# Patient Record
Sex: FEMALE | Race: WHITE | NOT HISPANIC OR LATINO | Employment: OTHER | URBAN - METROPOLITAN AREA
[De-identification: names, ages, dates, MRNs, and addresses within clinical notes are randomized per-mention and may not be internally consistent; named-entity substitution may affect disease eponyms.]

---

## 2017-04-24 LAB
CALCIUM SERPL-MCNC: 9.3 MG/DL (ref 9–10.9)
POTASSIUM SERPL-SCNC: 5 MMOL/L (ref 3.5–5.1)
SODIUM SERPL-SCNC: 141 MEQ/L (ref 131–145)

## 2017-06-28 LAB — CRC RECOMMENDATION EXT: NORMAL

## 2017-07-20 LAB
BILIRUB SERPL-MCNC: NORMAL MG/DL
BLOOD URINE, POC: NEGATIVE
GLUCOSE UR QL STRIP: NORMAL
KETONES UR QL STRIP: NEGATIVE
LEUKOCYTE EST, POC UA: NEGATIVE
NITRITE, POC UA: NEGATIVE
PH, POC UA: 5
PROTEIN, POC: NEGATIVE
SPECIFIC GRAVITY, POC UA: 1.02
UROBILINOGEN, POC UA: NORMAL

## 2019-06-24 ENCOUNTER — HISTORICAL (OUTPATIENT)
Dept: CARDIOLOGY | Facility: HOSPITAL | Age: 74
End: 2019-06-24

## 2019-11-08 LAB
BUN SERPL-MCNC: 16 MG/DL (ref 7–18)
CHOLEST SERPL-MCNC: 114 MG/DL
CREAT SERPL-MCNC: 0.54 MG/DL (ref 0.6–1.3)
GLUCOSE SERPL-MCNC: 139 MG/DL (ref 74–106)
HDLC SERPL-MCNC: 49 MG/DL (ref 35–60)
LDLC SERPL CALC-MCNC: 49 MG/DL
TRIGL SERPL-MCNC: 80 MG/DL (ref 30–150)

## 2020-09-08 ENCOUNTER — HISTORICAL (OUTPATIENT)
Dept: ADMINISTRATIVE | Facility: HOSPITAL | Age: 75
End: 2020-09-08

## 2020-09-29 ENCOUNTER — HISTORICAL (OUTPATIENT)
Dept: ADMINISTRATIVE | Facility: HOSPITAL | Age: 75
End: 2020-09-29

## 2020-12-31 LAB — BCS RECOMMENDATION EXT: NORMAL

## 2022-02-28 ENCOUNTER — PATIENT OUTREACH (OUTPATIENT)
Dept: ADMINISTRATIVE | Facility: CLINIC | Age: 77
End: 2022-02-28

## 2022-02-28 ENCOUNTER — EXTERNAL HOSPITAL ADMISSION (OUTPATIENT)
Dept: ADMINISTRATIVE | Facility: CLINIC | Age: 77
End: 2022-02-28

## 2022-02-28 RX ORDER — METOPROLOL SUCCINATE 25 MG/1
25 TABLET, EXTENDED RELEASE ORAL DAILY
COMMUNITY
End: 2023-06-15 | Stop reason: SDUPTHER

## 2022-02-28 NOTE — PROGRESS NOTES
C3 nurse attempted to contact Ade Mendez for a TCC post hospital discharge follow up call. No answer. The patient does not have a scheduled HOSFU appointment, and the pt does not have an [facility] PCP.    C3 nurse spoke with Ade Mendez for a TCC post hospital discharge follow up call. The patient reports does not have a scheduled HOSFU appointment. C3 nurse was unable to schedule HOSFU appointment for Non-[facility] PCP. Patient advised to contact their PCP to schedule a HOSPFU within 7 days. Outside NP service.

## 2022-04-10 ENCOUNTER — HISTORICAL (OUTPATIENT)
Dept: ADMINISTRATIVE | Facility: HOSPITAL | Age: 77
End: 2022-04-10

## 2022-04-26 VITALS
BODY MASS INDEX: 31.57 KG/M2 | DIASTOLIC BLOOD PRESSURE: 76 MMHG | HEIGHT: 62 IN | WEIGHT: 171.56 LBS | SYSTOLIC BLOOD PRESSURE: 138 MMHG | OXYGEN SATURATION: 98 %

## 2022-04-30 NOTE — OP NOTE
Patient:   Ade Mendez             MRN: [id number]            FIN: [id number]               Age:   74 years     Sex:  Female     :  1945   Associated Diagnoses:   None   Author:   Hector Lane MD      Preoperative Diagnosis:  Cataract Right eye    Postoperative Diagnosis:  Cataract Right eye    Procedure:  Phacoemulsification with intraocular lens implantation Right eye    Surgeon:  Hector Lane MD    Assistant:  Briana Skelton [facility]    Anestheisa:  MAC    Complications:  None    The patient was brought to the [facility].  A time out was performed.  The capsulotomy, lens fragmentation and limbal relaxing incisions were completed.  Then the patient was brought into the operating suite, where the patient was correctly identified as was the operative eye via timeout.  The patient was prepped and draped in a sterile ophthalmic fashion.  A lid speculum was placed in the operative eye and the microscope was brought into place.  A 1.0 mm paracentesis was then made at (12) o'clock.  The anterior chamber was filled with Endocoat.  A (temporal) clear corneal incision was made with a 2.4 mm keratome blade.  A 6 mm corneal marking ring was used to fadia the cornea centered over the visual axis.  A 5.00 mm continuous curvilinear capsulorhexis was fashioned using a cystotome and microcapsular forceps.  Hydrodissection and hydrodelineation was performed with upreserved 1% Xylocaine.  The nucleus was then phacoemulsified with the Abbott phacoemulsification hand-piece with a total of (25) EFX.  The cortex was then removed with the I/A hand-piece.  The lens model (ZCB00) with a power of (22.5) was placed in the capsular bag.  The Helon was then removed from the eye with the I/A hand piece.  The anterior chamber was inflated and the wounds were hydrated with BSS.  The wounds were checked with Weck-Bessie sponges and found to be watertight.  The lid speculum was removed and topical antibiotics were placed on the  operative eye.  The patient was brought to PACU in good condition.

## 2022-04-30 NOTE — OP NOTE
Patient:   Ade Mendez             MRN: [id number]            FIN: [id number]               Age:   74 years     Sex:  Female     :  1945   Associated Diagnoses:   None   Author:   Hector Lane MD      Preoperative Diagnosis:  Cataract Left eye    Postoperative Diagnosis:  Cataract Left eye    Procedure:  Phacoemulsification with intraocular lens implantation Left eye    Surgeon:  Hector Lane MD    Assistant:   Briana Skelton [facility]    Anestheisa:  MAC    Complications:  None    The patient was brought to the [facility] laser.  A time out was performed.  The capsulotomy, lens fragmentation and limbal relaxing incisions were completed.  Then the patient was brought into the operating suite, where the patient was correctly identified as was the operative eye via timeout.  The patient was prepped and draped in a sterile ophthalmic fashion.  A lid speculum was placed in the operative eye and the microscope was brought into place.  A 1.0 mm paracentesis was then made at (6) o'clock.  The anterior chamber was filled with Endocoat.  A (temporal) clear corneal incision was made with a 2.4 mm keratome blade.  A 6 mm corneal marking ring was used to fadia the cornea centered over the visual axis.  A 5.00 mm continuous curvilinear capsulorhexis was fashioned using a cystotome and microcapsular forceps.  Hydrodissection and hydrodelineation was performed with upreserved 1% Xylocaine.  The nucleus was then phacoemulsified with the Abbott phacoemulsification hand-piece with a total of (5) EFX.  The cortex was then removed with the I/A hand-piece.  The lens model (ZCB00) with a power of (23.0) was placed in the capsular bag.  The Helon was then removed from the eye with the I/A hand piece.  The anterior chamber was inflated and the wounds were hydrated with BSS.  The wounds were checked with Weck-Bessie sponges and found to be watertight.  The lid speculum was removed and topical antibiotics were placed on the operative  eye.  The patient was brought to PACU in good condition.

## 2022-06-12 PROBLEM — I65.29 STENOSIS OF CAROTID ARTERY: Status: ACTIVE | Noted: 2022-06-12

## 2022-06-12 PROBLEM — I25.10 ATHEROSCLEROSIS OF CORONARY ARTERY: Status: ACTIVE | Noted: 2022-06-12

## 2022-06-12 PROBLEM — R74.8 HIGH ALKALINE PHOSPHATASE: Status: ACTIVE | Noted: 2022-06-12

## 2022-06-12 PROBLEM — E78.5 HYPERLIPIDEMIA: Status: ACTIVE | Noted: 2022-06-12

## 2022-06-12 PROBLEM — E11.21 DIABETIC NEPHROPATHY ASSOCIATED WITH TYPE 2 DIABETES MELLITUS: Status: ACTIVE | Noted: 2022-06-12

## 2022-06-12 PROBLEM — E11.9 TYPE 2 DIABETES MELLITUS: Status: ACTIVE | Noted: 2022-06-12

## 2022-06-12 PROBLEM — F41.9 ANXIETY: Status: ACTIVE | Noted: 2022-06-12

## 2022-06-12 PROBLEM — M81.0 OSTEOPOROSIS: Status: ACTIVE | Noted: 2022-06-12

## 2022-06-13 NOTE — PROGRESS NOTES
Venessa Jaime MD   [address] [facility] [place] [address]     PATIENT NAME: Ade Mendez  : 1945  DATE: 22  MRN: [id number]      Billing Provider: Venessa Jaime MD  Level of Service: [place] WELCOME MEDICARE ANNUAL WELLNESS SUBSEQUENT VISIT  Patient PCP Information     Provider PCP Type    Venessa Jaime MD General          Reason for Visit / Chief Complaint: Medicare AWV       Update PCP  Update Chief Complaint         History of Present Illness / Problem Focused Workflow     Ade Mendez presents to the clinic with Medicare AWV     76 year old CF seen for annual exam. She was hospitalized at the [facility] in Feb after an abnormal nuclear stress with Dr Ortega and ended up with a quadruple bypass with Dr Narayanan. She did well and spent 2 weeks with her daughter then back home. She has a plate instead of wires. She notes some swelling in the left leg where they took her vein. She is due to follow up with Dr Valdez and Valentino She will do lab with them and send it to me. She also went to [facility] for sinus/allergy issues. She is still seeing Dr Denton for her annual eye check. She does her mammogram with Dr Doll.     Fall assessment negative  Cognitive assessment normal.   TUG test negative  Depression Screen negative  Whisper test normal  Home safety assesed  Advanced directive has one discussion 15 minutes.       Review of Systems     Review of Systems   Constitutional:        Not as much energy as she once had but still gets out and walks   HENT:        Had sinus congestion it is better now.    Eyes: Negative.    Respiratory: Negative.    Cardiovascular:        Negative excepts as per HPI   Gastrointestinal: Negative.    Endocrine:        Sugars have been a bit high since surgery, she had spoken with Tee about it. She's not sure why   Genitourinary: Negative.    Musculoskeletal:        No arthritis, walked fine the day of surgery   Skin:        Still sees dermatologist, Dr Bhandari did  Moh's on her scalp   Allergic/Immunologic: Positive for environmental allergies.   Neurological: Negative.    Hematological: Negative.    Psychiatric/Behavioral: Negative.        Medical / Social / Family History     Past Medical History:   Diagnosis Date    Alkaline phosphatase elevation     Anxiety     CAD (coronary artery disease)     Cataracts, bilateral     Diabetes mellitus, type 2     Diverticulosis     Hepatitis B     Osteoporosis     Rectal polyp 07/05/2017    Renal disorder        Past Surgical History:   Procedure Laterality Date    APPENDECTOMY      CATARACT EXTRACTION, BILATERAL      CORONARY ARTERY BYPASS GRAFT (CABG)  02/21/2022    quadruple    CORONARY STENT PLACEMENT      KNEE SURGERY      REPAIR OF HOLE IN MACULA      SKIN CANCER EXCISION  06/07/2021    head lesion    TOTAL ABDOMINAL HYSTERECTOMY      WRIST SURGERY  10/10/2019       Social History  Ms. Mendez      reports that she has never smoked. She has never used smokeless tobacco.    Family History  Ms.'s Mendez   family history includes Diabetes in her mother; Liver cancer in her brother; Lung cancer in her brother; Schizophrenia in her brother.    Medications and Allergies     Medications  Outpatient Medications Marked as Taking for the 6/14/22 encounter (Office Visit) with Venessa Jaime MD   Medication Sig Dispense Refill    aspirin (ECOTRIN) 81 MG EC tablet Take 81 mg by mouth once daily at 6am.      empagliflozin (JARDIANCE) 10 mg tablet Take 1 tablet by mouth once daily.      glimepiride (AMARYL) 4 MG tablet Take 4 mg by mouth 2 (two) times a day.      metFORMIN (GLUCOPHAGE) 1000 MG tablet Take 1,000 mg by mouth 2 (two) times a day.      metoprolol succinate (TOPROL-XL) 25 MG 24 hr tablet Take 25 mg by mouth once daily.      metoprolol tartrate (LOPRESSOR) 25 MG tablet Take 1 tablet by mouth 2 (two) times daily.      montelukast (SINGULAIR) 10 mg tablet Take 10 mg by mouth once daily at 6am.       risedronate (ACTONEL) 150 MG Tab Take 150 mg by mouth every 30 days.      rosuvastatin (CRESTOR) 10 MG tablet Take 10 mg by mouth once daily at 6am.         Allergies  Review of patient's allergies indicates:   Allergen Reactions    Oxycontin [oxycodone]        Physical Examination     Vitals:    06/14/22 0859   BP: 123/76   Pulse: 65   Resp: 16   Temp: 97.9 °F (36.6 °C)     Physical Exam  Vitals reviewed.   Constitutional:       Appearance: Normal appearance.   HENT:      Head: Normocephalic.      Comments: Some wax on right canal     Right Ear: Tympanic membrane and external ear normal.      Left Ear: Tympanic membrane, ear canal and external ear normal.      Mouth/Throat:      Mouth: Mucous membranes are moist.      Pharynx: No oropharyngeal exudate.   Eyes:      Extraocular Movements: Extraocular movements intact.      Pupils: Pupils are equal, round, and reactive to light.   Cardiovascular:      Rate and Rhythm: Normal rate and regular rhythm.      Pulses:           Dorsalis pedis pulses are 3+ on the right side and 3+ on the left side.   Pulmonary:      Effort: Pulmonary effort is normal.      Breath sounds: Normal breath sounds. No rhonchi.   Abdominal:      General: Bowel sounds are normal.      Palpations: Abdomen is soft.      Tenderness: There is no abdominal tenderness. There is no guarding.      Hernia: No hernia is present.   Musculoskeletal:         General: Normal range of motion.      Cervical back: Normal range of motion and neck supple. No tenderness.      Right lower leg: Edema present.      Left lower leg: Edema present.   Feet:      Right foot:      Protective Sensation: 10 sites tested. 10 sites sensed.      Skin integrity: Skin integrity normal.      Left foot:      Protective Sensation: 10 sites tested. 10 sites sensed.      Skin integrity: Skin integrity normal.   Skin:     General: Skin is warm and dry.      Comments: Incisions have healed great   Neurological:      General: No focal  deficit present.      Mental Status: She is alert and oriented to person, place, and time.      Coordination: Coordination normal.      Gait: Gait normal.   Psychiatric:         Mood and Affect: Mood normal.         Behavior: Behavior normal.         Thought Content: Thought content normal.         Judgment: Judgment normal.           Assessment and Plan (including Health Maintenance)      Problem List  Smart [employer]  Document Outside HM   :    Plan:   Annual exam she's had major surgery since last here but is back at baseline for activity, will get lab with her specialist and have it sent to us from [employer]. She will also remind her eye doctor to send us a copy Health Maintenance is unreliable due to the merge of data from [email or web address].   Reviewed records in [employer] from outside for surgery and preop. Limited lab I could find.      Health Maintenance Due   Topic Date Due    Hepatitis C Screening  Never done    TETANUS VACCINE  Never done    DEXA Scan  06/28/2022       Problem List Items Addressed This Visit    None         Health Maintenance Topics with due status: Not Due       Topic Last Completion Date    Lipid Panel 01/17/2022       Future Appointments   Date Time Provider Department Center   9/28/2022  8:30 AM Robert Doll MD [facility]   6/15/2023  8:20 AM Venessa Jaime MD [facility]            Signature:  Venessa Jaime MD  [facility]  [address] [place] [address]    Date of encounter: 6/14/22

## 2022-06-14 ENCOUNTER — OFFICE VISIT (OUTPATIENT)
Dept: PRIMARY CARE CLINIC | Facility: CLINIC | Age: 77
End: 2022-06-14
Payer: MEDICARE

## 2022-06-14 VITALS
RESPIRATION RATE: 16 BRPM | HEART RATE: 65 BPM | TEMPERATURE: 98 F | WEIGHT: 162.63 LBS | BODY MASS INDEX: 31.93 KG/M2 | OXYGEN SATURATION: 97 % | DIASTOLIC BLOOD PRESSURE: 76 MMHG | HEIGHT: 60 IN | SYSTOLIC BLOOD PRESSURE: 123 MMHG

## 2022-06-14 DIAGNOSIS — Z95.1 HX OF CABG: ICD-10-CM

## 2022-06-14 DIAGNOSIS — E11.59 TYPE 2 DIABETES MELLITUS WITH OTHER CIRCULATORY COMPLICATION, WITHOUT LONG-TERM CURRENT USE OF INSULIN: ICD-10-CM

## 2022-06-14 DIAGNOSIS — Z71.89 ADVANCE CARE PLANNING: ICD-10-CM

## 2022-06-14 DIAGNOSIS — Z00.00 MEDICARE ANNUAL WELLNESS VISIT, SUBSEQUENT: Primary | ICD-10-CM

## 2022-06-14 DIAGNOSIS — Z85.828 HISTORY OF SKIN CANCER: ICD-10-CM

## 2022-06-14 PROCEDURE — G0439 PR MEDICARE ANNUAL WELLNESS SUBSEQUENT VISIT: ICD-10-PCS | Mod: ,,, | Performed by: INTERNAL MEDICINE

## 2022-06-14 PROCEDURE — G0439 PPPS, SUBSEQ VISIT: HCPCS | Mod: ,,, | Performed by: INTERNAL MEDICINE

## 2022-06-14 PROCEDURE — 99497 ADVNCD CARE PLAN 30 MIN: CPT | Mod: 33,,, | Performed by: INTERNAL MEDICINE

## 2022-06-14 PROCEDURE — 99497 PR ADVNCD CARE PLAN 30 MIN: ICD-10-PCS | Mod: 33,,, | Performed by: INTERNAL MEDICINE

## 2022-06-14 RX ORDER — ASPIRIN 81 MG/1
81 TABLET ORAL DAILY
COMMUNITY

## 2022-06-14 RX ORDER — EMPAGLIFLOZIN 10 MG/1
1 TABLET, FILM COATED ORAL DAILY
COMMUNITY

## 2022-06-14 RX ORDER — RISEDRONATE SODIUM 150 MG/1
150 TABLET, FILM COATED ORAL
COMMUNITY

## 2022-06-14 RX ORDER — METFORMIN HYDROCHLORIDE 1000 MG/1
1000 TABLET ORAL 2 TIMES DAILY
COMMUNITY

## 2022-06-14 RX ORDER — GLIMEPIRIDE 4 MG/1
4 TABLET ORAL 2 TIMES DAILY
COMMUNITY

## 2022-06-14 RX ORDER — METOPROLOL TARTRATE 25 MG/1
1 TABLET, FILM COATED ORAL 2 TIMES DAILY
COMMUNITY
Start: 2022-02-25 | End: 2023-06-15

## 2022-06-14 RX ORDER — MONTELUKAST SODIUM 10 MG/1
10 TABLET ORAL DAILY
COMMUNITY

## 2022-06-14 RX ORDER — ROSUVASTATIN CALCIUM 10 MG/1
10 TABLET, COATED ORAL DAILY
COMMUNITY

## 2022-06-14 NOTE — PROGRESS NOTES
",TIME UP & GO (TUG)  Test begins with patient sitting back in standard arm chair.   When "Go" is said, the patient stands up and walks 10 feet at a normal pace before turning, walking back and sitting down.    Observe the patients postural stability, gait, stride length, and sway.  Check all that apply:  ? [x] Slow tentative pace  ? [] Loss of balance  ? [] Short strides  ? [] Little or no arm swing  ? [] Steadying self on walls  ? [] Shuffling  ? [] En bloc turning  ? [] Not using assistive device properly    Time in seconds:  3 Seconds  (Older adults who takes = or > 12 seconds to complete TUG is at risk for falling.      WHISPER TEST  Test begins with patient standing arms length away (2 feet), facing away from examiner.  Patient covers the ear that is NOT being tested with one finger over the tragus.  Whisper a number-letter-number combination.  If a patient gets 3 total letters and/or numbers correct after a second attempt, it is considered a pass.    Right Ear: passed    [x] 8-M-3   [] K-5-R   [] 2-K-7   [] S-4-G  Left Ear: passed       [x] 8-M-3   [] K-5-R   [] 2-K-7   [] S-4-G      VISION SCREENING  unable to measure      MINI-COGNITIVE  Three Word Registration   []Version 1 [x]Version 2 []Version 3 []Version 4 []Version 5 []Version 6   [facility] Daughter   [place] [employer] Garden Heaven   Chair Table Baby Finger Picture Moutain     Word Recall 3 points  Clock Drawing 2      HOME SAFETY QUESTIONNAIRE  Are emergency numbers kept by the phone and regularly updated? YesyesAre all household members aware of the dangers of smoking, especially in bed? Yes  Are working smoke alarm(s) and fire extinguisher(s) available for use? yes  Do all household members know how to use them? Yes  Are firearms stored unloaded and securely locked? N/A   Have throw rugs been removed or fastened down? Yes  Are non-slip mats in all bathtubs and showers?  Yes  Do all stairways have a railing or " banister? N/A  Are sidewalks and all outdoor steps clear of tools, toys and other articles?  Yes  Are doorways, halls, and stairs free of clutter?  Yes  Are all electrical cords in working order, easily seen, and not run under rug/carpets or wrapped around nails? N/A

## 2022-06-22 ENCOUNTER — PATIENT OUTREACH (OUTPATIENT)
Dept: ADMINISTRATIVE | Facility: HOSPITAL | Age: 77
End: 2022-06-22
Payer: MEDICARE

## 2022-06-22 NOTE — PROGRESS NOTES
Population Health Outreach. The following record(s)  below were uploaded for Health Maintenance .    12/31/2020 MAMMOGRAM SCREENING              06/03/2021 EYE EXAM    06/28/2017 COLONOSCOPY

## 2022-08-29 ENCOUNTER — TELEPHONE (OUTPATIENT)
Dept: PRIMARY CARE CLINIC | Facility: CLINIC | Age: 77
End: 2022-08-29
Payer: MEDICARE

## 2022-08-29 NOTE — TELEPHONE ENCOUNTER
"----- Message from Rahul Farhat sent at 8/29/2022  8:40 AM CDT -----  .Type:  Needs Medical Advice    Who Called: pt  Would the patient rather a call back or a response via [employer]? Call back  Best Call Back Number: [phone]  Additional Information: call back see demands to speak with "the front office" said she has called multiple times and left multiple messages but no one has called her back       "

## 2022-08-29 NOTE — TELEPHONE ENCOUNTER
----- Message from DeEbonyJoceline Grady sent at 8/29/2022  9:20 AM CDT -----  Regarding: Patient Call  Type:  Patient Returning Call    Who Called: patient  Who Left Message for Patient: nurse  Does the patient know what this is regarding?: yes  Would the patient rather a call back or a response via [employer]?  Call back   Best Call Back Number: [phone]  Additional Information:  patient requesting a call back about billing ; she didn't want to be transferred to the correct department, and is not understanding how the office makes up these prices - she was charged twice $528 for something she didn't get

## 2022-09-18 ENCOUNTER — HISTORICAL (OUTPATIENT)
Dept: ADMINISTRATIVE | Facility: HOSPITAL | Age: 77
End: 2022-09-18
Payer: MEDICARE

## 2022-09-19 ENCOUNTER — HISTORICAL (OUTPATIENT)
Dept: ADMINISTRATIVE | Facility: HOSPITAL | Age: 77
End: 2022-09-19
Payer: MEDICARE

## 2023-04-21 LAB
BCS RECOMMENDATION EXT: NORMAL
BMD RECOMMENDATION EXT: NORMAL

## 2023-04-24 LAB — HBA1C MFR BLD: 8.7 % (ref 4–6)

## 2023-06-07 ENCOUNTER — TELEPHONE (OUTPATIENT)
Dept: PRIMARY CARE CLINIC | Facility: CLINIC | Age: 78
End: 2023-06-07
Payer: MEDICARE

## 2023-06-07 NOTE — TELEPHONE ENCOUNTER
Are there any outstanding task in patient chart?  N     2. Do we have outstanding/pending referrals?  N     3. Has the patient been seen in an ER, Urgent Care, or admitted since last visit?  N     4. Has patient seen any other healthcare providers since last visit?  N     5. Has patient had any blood work or xrays done since last visit?  N   6. Is the patient's pneumonia vaccine current?  Prevnar 13: 1/10/19  Pneumonia 20: n/a  Pneumonia 23: 11/25/20    7. When was the patient's colonoscopy?  N/a  8. When was the patient's last mamogram?  N/a  9. When was the patient's last cervical screening/PAP smear?  N/a  10. When was the patient's last bone scan?  6/28/19  11. When was the patient's last diabetic screening?  A1c: n/a  Micro: n/a  Eye Exam: n/a

## 2023-06-14 PROBLEM — E78.2 MIXED HYPERLIPIDEMIA: Status: ACTIVE | Noted: 2022-06-12

## 2023-06-14 NOTE — PROGRESS NOTES
Patient ID: [id number]     Chief Complaint: Medicare AWV      HPI:     Ade Mendez is a 77 y.o. female here today for a Medicare Wellness.       Opioid Screening: Patient medication list reviewed, patient is not taking prescription opioids. Patient is not using additional opioids than prescribed. Patient is at low risk of substance abuse based on this opioid use history.       Past Medical History:   Diagnosis Date    Alkaline phosphatase elevation     Anxiety     CAD (coronary artery disease)     Cataracts, bilateral     Diabetes mellitus, type 2     Diverticulosis     Hepatitis B     Osteoporosis     Rectal polyp 07/05/2017    Renal disorder         Past Surgical History:   Procedure Laterality Date    APPENDECTOMY      CATARACT EXTRACTION, BILATERAL      COLONOSCOPY W/ BIOPSIES  06/28/2017    CORONARY ARTERY BYPASS GRAFT (CABG)  02/21/2022    quadruple    CORONARY STENT PLACEMENT      KNEE SURGERY      REPAIR OF HOLE IN [place]      SKIN CANCER EXCISION  06/07/2021    head lesion    TOTAL ABDOMINAL HYSTERECTOMY      WRIST SURGERY  10/10/2019       Review of patient's allergies indicates:   Allergen Reactions    Oxycontin [oxycodone]        Outpatient Medications Marked as Taking for the 6/15/23 encounter (Office Visit) with Venessa Jaime MD   Medication Sig Dispense Refill    aspirin (ECOTRIN) 81 MG EC tablet Take 81 mg by mouth once daily at 6am.      calcium-vitamin D (CALCIUM 600 + D,3,) 600 mg-10 mcg (400 unit) Tab Take 2 tablets by mouth once.      cholecalciferol, vitamin D3, (VITAMIN D3) 50 mcg (2,000 unit) Cap capsule Take 4,000 Units by mouth once daily.      cinnamon bark (CINNAMON ORAL) Take 1,000 mg by mouth.      coenzyme Q10 400 mg Cap Take by mouth.      cranberry conc-ascorbic acid 4,200-20 mg Cap Take by mouth.      empagliflozin (JARDIANCE) 10 mg tablet Take 1 tablet by mouth once daily.      FLAXSEED OIL ORAL Take 1,200 mg by mouth.      glimepiride (AMARYL) 4 MG tablet Take 4 mg by mouth 2  (two) times a day.      LINZESS 72 mcg Cap capsule Take 72 mcg by mouth.      metFORMIN (GLUCOPHAGE) 1000 MG tablet Take 1,000 mg by mouth 2 (two) times a day.      metoprolol tartrate (LOPRESSOR) 25 MG tablet Take 1 tablet by mouth 2 (two) times daily.      montelukast (SINGULAIR) 10 mg tablet Take 10 mg by mouth once daily at 6am.      mv-min-folic-vit K-lut-herb293 (ALIVE WOMEN'S 50 PLUS, BLEND,) 240-120-300 mcg Tab Take by mouth.      olopatadine HCl (PATADAY OPHT) Apply to eye.      omega-3 fatty acids/fish oil (FISH OIL-OMEGA-3 FATTY ACIDS) 300-1,000 mg capsule Take by mouth once daily.      polyvinyl alcohol (LIQUID TEARS OPHT) Apply to eye.      risedronate (ACTONEL) 150 MG Tab Take 150 mg by mouth every 30 days.      rosuvastatin (CRESTOR) 10 MG tablet Take 10 mg by mouth once daily at 6am.      SITagliptin phosphate (JANUVIA) 100 MG Tab Take 100 mg by mouth every other day.      spironolactone (ALDACTONE) 25 MG tablet Take 25 mg by mouth once daily.      [DISCONTINUED] metoprolol succinate (TOPROL-XL) 25 MG 24 hr tablet Take 25 mg by mouth once daily.         Social History     Socioeconomic History    Marital status: Single   Tobacco Use    Smoking status: Never    Smokeless tobacco: Never     Social Determinants of Health     Financial Resource Strain: Low Risk     Difficulty of Paying Living Expenses: Not hard at all   Food Insecurity: No Food Insecurity    Worried About Running Out of Food in the Last Year: Never true    Ran Out of Food in the Last Year: Never true   Transportation Needs: No Transportation Needs    Lack of Transportation (Medical): No    Lack of Transportation (Non-Medical): No   Physical Activity: Sufficiently Active    Days of Exercise per Week: 5 days    Minutes of Exercise per Session: 40 min   Stress: No Stress Concern Present    Feeling of Stress : Not at all   Social Connections: Moderately Integrated    Frequency of Communication with Friends and Family: More than three times a  week    Frequency of Social Gatherings with Friends and Family: Three times a week    Attends Sabian[employer] Services: 1 to 4 times per year    Active Member of Clubs or Organizations: Yes    Attends Club or Organization Meetings: 1 to 4 times per year    Marital Status: Never    Housing Stability: Low Risk     Unable to Pay for Housing in the Last Year: No    Number of Places Lived in the Last Year: 1    Unstable Housing in the Last Year: No   Going to [place] then [facility] and next will be [place].     Family History   Problem Relation Age of Onset    Diabetes Mother     Liver cancer Brother     Lung cancer Brother     Schizophrenia Brother         Patient Care Team:  Venessa Jaime MD as PCP - General (Internal Medicine)  Bri Oliveira MD as Consulting Physician (Endocrinology)  Vernon A. Valentino, MD as Consulting Physician (Cardiology)  Hector Lane MD as Consulting Physician (Ophthalmology)  Arya Denton OD (Ophthalmology)  Robert Doll MD as Consulting Physician (Obstetrics and Gynecology)  Milena Altman MD (Dermatology)  Star Narayanan MD as Consulting Physician (Cardiothoracic Surgery)  PRAFUL Call MD as Consulting Physician (Gastroenterology)       Subjective:     Review of Systems   Constitutional:         Not exercising a lot but stays active in house   HENT: Negative.     Eyes:         Goes in the next week   Respiratory: Negative.     Cardiovascular:         Sees Valentino in July again   Gastrointestinal:  Positive for constipation.        Still using linzess    Genitourinary:         + urge incontinence, she uses a pad.    Musculoskeletal:  Positive for joint pain.        R shoulder at times.    Skin:         It's dry but she manages   Neurological: Negative.    Endo/Heme/Allergies:         [facility] Dr Oliveira didn't say anything about the readings and A1C matchup. She did DEXA with [facility] but didn't get copy   Psychiatric/Behavioral:  Negative.         Patient Reported Health Risk Assessment  What [age] 70-79  Are you male or female?: Female  During the past four weeks, how much have you been bothered by emotional problems such as feeling anxious, depressed, irritable, sad, or downhearted and blue?: Not at all  During the past five weeks, has your physical and/or emotional health limited your social activities with family, friends, neighbors, or groups?: Not at all  During the past four weeks, how much bodily pain have you generally had?: Mild pain  During the past four weeks, was someone available to help if you needed and wanted help?: Yes, as much as I wanted  During the past four weeks, what was the hardest physical activity you could do for at least two minutes?: Moderate  Can you get to places out of walking distance without help?  (For example, can you travel alone on buses or taxis, or drive your own car?): Yes  Can you go shopping for groceries or clothes without someone's help?: Yes  Can you prepare your own meals?: Yes  Can you do your own housework without help?: Yes  Because of any health problems, do you need the help of another person with your personal care needs such as eating, bathing, dressing, or getting around the house?: No  Can you handle your own money without help?: Yes  During the past four weeks, how would you rate your health in general?: Good  How have things been going for you during the past four weeks?: Pretty well  Are you having difficulties driving your car?: No  Do you always fasten your seat belt when you are in a car?: Yes, usually  How often in the past four weeks have you been bothered by falling or dizzy when standing up?: Never  How often in the past four weeks have you been bothered by sexual problems?: Never  How often in the past four weeks have you been bothered by trouble eating well?: Never  How often in the past four weeks have you been bothered by teeth or denture problems?: Never  How  often in the past four weeks have you been bothered with problems using the telephone?: Never  How often in the past four weeks have you been bothered by tiredness or fatigue?: Never  Have you fallen two or more times in the past year?: No  Are you afraid of falling?: No  Are you a smoker?: No  During the past four weeks, how many drinks of wine, beer, or other alcoholic beverages did you have?: No alcohol at all  Do you exercise for about 20 minutes three or more days a week?: Yes, some of the time  Have you been given any information to help you with hazards in your house that might hurt you?: Yes  Have you been given any information to help you with keeping track of your medications?: Yes  How often do you have trouble taking medicines the way you've been told to take them?: I always take them as prescribed  How confident are you that you can control and manage most of your health problems?: Very confident  What is your race? (Check all that apply.):     Objective:     /67 (BP Location: Left arm, Patient Position: Sitting, BP Method: Large (Automatic))   Pulse 68   Temp 98.1 °F (36.7 °C) (Oral)   Resp 16   Ht 5' (1.524 m)   Wt 75.3 kg (166 lb)   SpO2 97%   BMI 32.42 kg/m²     Physical Exam  Vitals reviewed.   Constitutional:       Appearance: She is obese.   HENT:      Head: Normocephalic and atraumatic.      Right Ear: Tympanic membrane, ear canal and external ear normal.      Left Ear: Tympanic membrane, ear canal and external ear normal.      Mouth/Throat:      Mouth: Mucous membranes are moist.      Pharynx: No posterior oropharyngeal erythema.   Eyes:      General: No scleral icterus.     Extraocular Movements: Extraocular movements intact.      Pupils: Pupils are equal, round, and reactive to light.   Cardiovascular:      Rate and Rhythm: Normal rate and regular rhythm.      Pulses:           Dorsalis pedis pulses are 2+ on the right side and 2+ on the left side.        Posterior  tibial pulses are 2+ on the right side and 2+ on the left side.      Heart sounds:     No gallop.   Pulmonary:      Effort: Pulmonary effort is normal.      Breath sounds: Normal breath sounds. No wheezing or rhonchi.   Abdominal:      General: Bowel sounds are normal.      Palpations: Abdomen is soft.      Tenderness: There is no abdominal tenderness. There is no guarding.   Musculoskeletal:         General: No swelling or tenderness.      Right foot: Normal range of motion.      Left foot: Normal range of motion.   Feet:      Right foot:      Protective Sensation: 10 sites tested.  10 sites sensed.      Skin integrity: Dry skin present. No callus.      Toenail Condition: Right toenails are normal.      Left foot:      Protective Sensation: 10 sites tested.  10 sites sensed.      Skin integrity: Dry skin present. No callus.      Toenail Condition: Left toenails are normal.   Skin:     General: Skin is warm and dry.      Findings: Erythema present. No bruising or rash.   Neurological:      Mental Status: She is alert and oriented to person, place, and time. Mental status is at baseline.      Motor: No weakness.      Gait: Gait normal.   Psychiatric:         Mood and Affect: Mood normal.         Behavior: Behavior normal.         Thought Content: Thought content normal.         Judgment: Judgment normal.         Checklist of Activities of Daily Living 6/14/2022   Bathing Independent   Dressing Independent   Grooming Independent   Oral Care Independent   Toileting Independent   Transferring Independent   Walking Independent   Climbing Stairs Independent   Eating Independent   Shopping Independent   Cooking Independent   Managing Medications Independent   Using the Phone Independent   Housework Indpendent   Laundry Independent   Driving Independent   Managing Finances Independent     Fall Risk Assessment - Outpatient 6/15/2023 6/14/2022   Mobility Status Ambulatory -   Number of falls 0 0   Identified as fall risk 0 0            Depression Screening  Over the past two weeks, has the patient felt down, depressed, or hopeless?: No  Over the past two weeks, has the patient felt little interest or pleasure in doing things?: No  Functional Ability/Safety Screening  Was the patient's timed Up & Go test unsteady or longer than 30 seconds?: No  Does the patient need help with phone, transportation, shopping, preparing meals, housework, laundry, meds, or managing money?: No  Does the patient's home have rugs in the hallway, lack grab bars in the bathroom, lack handrails on the stairs or have poor lighting?: No  Have you noticed any hearing difficulties?: No  Cognitive Function (Assessed through direct observation with due consideration of information obtained by way of patient reports and/or concerns raised by family, friends, caretakers, or others)    Does the patient repeat questions/statements in the same day?: No  Does the patient have trouble remembering the date, year, and time?: No  Does the patient have difficulty managing finances?: No  Does the patient have a decreased sense of direction?: No  Assessment/Plan:     1. Medicare annual wellness visit, subsequent    2. Atherosclerosis of native coronary artery of native heart without angina pectoris  Comments:  Bypass 2/22 and back active although not as much exercise as I'd like    3. Mixed hyperlipidemia  Comments:  Did her lab at [facility] for all of us    4. Stenosis of carotid artery, unspecified laterality  Comments:  Sees Valentino in July    5. Age-related osteoporosis without current pathological fracture  Comments:  Did DEXA will request    6. Diabetic nephropathy associated with type 2 diabetes mellitus  Comments:  CBG's don't match A1C she will [facility], I suspect it is not registering right.  Dr Oliveira didn't change things and sees in Oct  Overview:  proteinuria  proteinuria      7. Urge incontinence           Medicare Annual Wellness and Personalized  Prevention Plan:   Fall Risk + Home Safety + Hearing Impairment + Depression Screen + Opioid and Substance Abuse Screening + Cognitive Impairment Screen + Health Risk Assessment all reviewed.     Health Maintenance Topics with due status: Not Due       Topic Last Completion Date    Lipid Panel 04/24/2023    Hemoglobin A1c 04/24/2023      The patient's Health Maintenance was reviewed and the following appears to be due at this time:   Health Maintenance Due   Topic Date Due    Hepatitis C Screening  Never done    Diabetes Urine Screening  Never done    TETANUS VACCINE  Never done    Eye Exam  06/03/2022    DEXA Scan  06/28/2022    COVID-19 Vaccine (6 - Moderna series) 07/05/2022       Advance Care Planning   I attest to discussing Advance Care Planning with patient and/or family member.  Education was provided including the importance of the Health Care Power of , Advance Directives, and/or [place] documentation.  The patient expressed understanding to the importance of this information and discussion.     Advance Care Planning     Date: 06/15/2023  Has living will and advanced directive,she did it at [facility]. 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  Discussion 5 min.          Follow up in about 1 year (around 6/15/2024) for Wellness. In addition to their scheduled follow up, the patient has also been instructed to follow up on as needed basis.

## 2023-06-15 ENCOUNTER — DOCUMENTATION ONLY (OUTPATIENT)
Dept: PRIMARY CARE CLINIC | Facility: CLINIC | Age: 78
End: 2023-06-15

## 2023-06-15 ENCOUNTER — OFFICE VISIT (OUTPATIENT)
Dept: PRIMARY CARE CLINIC | Facility: CLINIC | Age: 78
End: 2023-06-15
Payer: MEDICARE

## 2023-06-15 ENCOUNTER — PATIENT MESSAGE (OUTPATIENT)
Dept: PRIMARY CARE CLINIC | Facility: CLINIC | Age: 78
End: 2023-06-15

## 2023-06-15 VITALS
HEIGHT: 60 IN | HEART RATE: 68 BPM | WEIGHT: 166 LBS | BODY MASS INDEX: 32.59 KG/M2 | TEMPERATURE: 98 F | OXYGEN SATURATION: 97 % | DIASTOLIC BLOOD PRESSURE: 67 MMHG | RESPIRATION RATE: 16 BRPM | SYSTOLIC BLOOD PRESSURE: 111 MMHG

## 2023-06-15 DIAGNOSIS — I25.10 ATHEROSCLEROSIS OF NATIVE CORONARY ARTERY OF NATIVE HEART WITHOUT ANGINA PECTORIS: ICD-10-CM

## 2023-06-15 DIAGNOSIS — E78.2 MIXED HYPERLIPIDEMIA: ICD-10-CM

## 2023-06-15 DIAGNOSIS — I65.29 STENOSIS OF CAROTID ARTERY, UNSPECIFIED LATERALITY: ICD-10-CM

## 2023-06-15 DIAGNOSIS — M81.0 AGE-RELATED OSTEOPOROSIS WITHOUT CURRENT PATHOLOGICAL FRACTURE: ICD-10-CM

## 2023-06-15 DIAGNOSIS — N39.41 URGE INCONTINENCE: ICD-10-CM

## 2023-06-15 DIAGNOSIS — E11.21 DIABETIC NEPHROPATHY ASSOCIATED WITH TYPE 2 DIABETES MELLITUS: ICD-10-CM

## 2023-06-15 DIAGNOSIS — Z00.00 MEDICARE ANNUAL WELLNESS VISIT, SUBSEQUENT: Primary | ICD-10-CM

## 2023-06-15 PROBLEM — K63.5 POLYP OF COLON: Status: ACTIVE | Noted: 2022-11-08

## 2023-06-15 PROBLEM — K64.2 THIRD DEGREE HEMORRHOIDS: Status: ACTIVE | Noted: 2022-11-08

## 2023-06-15 PROBLEM — K62.1 RECTAL POLYP: Status: ACTIVE | Noted: 2017-06-28

## 2023-06-15 PROBLEM — K57.30 DIVERTICULOSIS OF LARGE INTESTINE WITHOUT PERFORATION OR ABSCESS WITHOUT BLEEDING: Status: ACTIVE | Noted: 2022-11-08

## 2023-06-15 PROCEDURE — G0439 PR MEDICARE ANNUAL WELLNESS SUBSEQUENT VISIT: ICD-10-PCS | Mod: ,,, | Performed by: INTERNAL MEDICINE

## 2023-06-15 PROCEDURE — G0439 PPPS, SUBSEQ VISIT: HCPCS | Mod: ,,, | Performed by: INTERNAL MEDICINE

## 2023-06-15 RX ORDER — ACETAMINOPHEN 500 MG
4000 TABLET ORAL DAILY
COMMUNITY

## 2023-06-15 RX ORDER — LINACLOTIDE 72 UG/1
72 CAPSULE, GELATIN COATED ORAL
COMMUNITY
Start: 2023-02-16

## 2023-06-15 RX ORDER — MULTIVITAMIN
2 TABLET ORAL ONCE
COMMUNITY
End: 2023-06-15

## 2023-06-15 RX ORDER — ASCORBIC ACID 125 MG
TABLET,CHEWABLE ORAL
COMMUNITY

## 2023-06-15 RX ORDER — SPIRONOLACTONE 25 MG/1
25 TABLET ORAL DAILY
COMMUNITY

## 2023-06-15 RX ORDER — DIAPER,BRIEF,ADULT, DISPOSABLE
EACH MISCELLANEOUS
COMMUNITY

## 2023-06-15 RX ORDER — VITAMIN E (DL,TOCOPHERYL ACET) 90 MG
CAPSULE ORAL
COMMUNITY

## 2023-06-15 RX ORDER — AMOXICILLIN 500 MG
CAPSULE ORAL DAILY
COMMUNITY

## 2023-06-15 NOTE — LETTER
AUTHORIZATION FOR RELEASE OF   CONFIDENTIAL INFORMATION    Dear Dr Cisse,    We are seeing Ade Mendez, date of birth 1945, in the clinic at [facility]. Venessa Jaime MD is the patient's PCP. Ade Mendez has an outstanding lab/procedure at the time we reviewed her chart. In order to help keep her health information updated, she has authorized us to request the following medical record(s):        (  )  MAMMOGRAM                                      (X)  COLONOSCOPY      (  )  PAP SMEAR                                          (  )  OUTSIDE LAB RESULTS     (  )  DEXA SCAN                                          (  )  EYE EXAM            (  )  FOOT EXAM                                          (  )  ENTIRE RECORD     (  )  OUTSIDE IMMUNIZATIONS                 (  )  _______________         Please fax records to [facility], Debra Durham, MD, [phone]     If you have any questions, please contact us at [phone].           Patient Name: Ade Mendez  : 1945  Patient Phone #: [phone]

## 2023-06-20 ENCOUNTER — DOCUMENTATION ONLY (OUTPATIENT)
Dept: PRIMARY CARE CLINIC | Facility: CLINIC | Age: 78
End: 2023-06-20
Payer: MEDICARE

## 2024-04-04 ENCOUNTER — PATIENT OUTREACH (OUTPATIENT)
Dept: ADMINISTRATIVE | Facility: HOSPITAL | Age: 79
End: 2024-04-04
Payer: MEDICARE

## 2024-05-07 LAB
CHOLESTEROL, TOTAL: 141
CREATININE, URINE: 90.1 MG/DL
HBA1C MFR BLD: 7.3 %
HDLC SERPL-MCNC: 54 MG/DL (ref 35–70)
LDLC SERPL CALC-MCNC: 71 MG/DL (ref 0–160)
PROT/CREAT UR: 105 MG/G{CREAT}
TRIGL SERPL-MCNC: 82 MG/DL (ref 40–160)
VLDLC SERPL-MCNC: 16 MG/DL

## 2024-06-10 ENCOUNTER — TELEPHONE (OUTPATIENT)
Dept: PRIMARY CARE CLINIC | Facility: CLINIC | Age: 79
End: 2024-06-10
Payer: MEDICARE

## 2024-06-17 ENCOUNTER — TELEPHONE (OUTPATIENT)
Dept: PRIMARY CARE CLINIC | Facility: CLINIC | Age: 79
End: 2024-06-17

## 2024-06-17 NOTE — TELEPHONE ENCOUNTER
----- Message from Melva Booker sent at 6/17/2024  7:32 AM CDT -----  Regarding: Appt  .Who Called: Ade Mendez    Caller is requesting a sooner appointment. Caller declined first available appointment listed below. Caller will not accept being placed on the waitlist and is requesting a message be sent to doctor.    When is the first available appointment?Aug  Options offered (Virtual Visit, Urgent Care):   Symptoms:Wellness      Preferred Method of Contact: Phone Call  Patient's Preferred Phone Number on File: [phone]   Best Call Back Number, if different:  Additional Information: Pt would like a morning appt, nothing avail until sometime in Aug, would like for Ms Barber to give her a call.

## 2024-07-02 LAB
LEFT EYE DM RETINOPATHY: NORMAL
RIGHT EYE DM RETINOPATHY: NORMAL

## 2024-11-13 ENCOUNTER — TELEPHONE (OUTPATIENT)
Dept: PRIMARY CARE CLINIC | Facility: CLINIC | Age: 79
End: 2024-11-13
Payer: MEDICARE

## 2024-11-18 PROBLEM — I65.23 BILATERAL CAROTID ARTERY STENOSIS: Status: ACTIVE | Noted: 2022-06-12
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
## 2024-11-18 NOTE — PATIENT INSTRUCTIONS
Jose Booker,     If you are due for any health screening(s) below please notify me so we can arrange them to be ordered and scheduled. Most healthy patients at your age complete them, but you are free to accept or refuse.     If you can't do it, I'll definitely understand. If you can, I'd certainly appreciate it!    All of your core healthy metrics are met.      Lets manage your A1c levels     Your last hemoglobin A1c was higher than the goal of less than 8. Hemoglobin A1c or HbA1c is a blood test that measures your average blood sugar levels over the past 3 months. It is the main test to help you and your health care team manage your diabetes.     Higher A1c levels are linked to diabetes complications, such as loss of vision, kidney disease, and nerve damage. Keeping your A1c at least less than 8 is important to stay healthy and we are here to help. Talk with your provider on how you can further improve your A1c.     We recommend that you set up blood work to get a repeat hemoglobin A1c in 3 months to monitor your diabetes. Let your health care team know if you have questions.     5-10 year preventative plan discussed

## 2024-11-19 ENCOUNTER — DOCUMENTATION ONLY (OUTPATIENT)
Dept: PRIMARY CARE CLINIC | Facility: CLINIC | Age: 79
End: 2024-11-19

## 2024-11-19 ENCOUNTER — OFFICE VISIT (OUTPATIENT)
Dept: PRIMARY CARE CLINIC | Facility: CLINIC | Age: 79
End: 2024-11-19
Payer: MEDICARE

## 2024-11-19 VITALS
OXYGEN SATURATION: 96 % | TEMPERATURE: 98 F | DIASTOLIC BLOOD PRESSURE: 69 MMHG | RESPIRATION RATE: 15 BRPM | SYSTOLIC BLOOD PRESSURE: 115 MMHG | BODY MASS INDEX: 32.43 KG/M2 | HEIGHT: 60 IN | HEART RATE: 63 BPM | WEIGHT: 165.19 LBS

## 2024-11-19 DIAGNOSIS — E11.65 UNCONTROLLED TYPE 2 DIABETES MELLITUS WITH HYPERGLYCEMIA: ICD-10-CM

## 2024-11-19 DIAGNOSIS — I25.10 ATHEROSCLEROSIS OF NATIVE CORONARY ARTERY OF NATIVE HEART WITHOUT ANGINA PECTORIS: ICD-10-CM

## 2024-11-19 DIAGNOSIS — E11.21 DIABETIC NEPHROPATHY ASSOCIATED WITH TYPE 2 DIABETES MELLITUS: ICD-10-CM

## 2024-11-19 DIAGNOSIS — I70.0 AORTIC ATHEROSCLEROSIS: ICD-10-CM

## 2024-11-19 DIAGNOSIS — Z00.00 MEDICARE ANNUAL WELLNESS VISIT, SUBSEQUENT: Primary | ICD-10-CM

## 2024-11-19 DIAGNOSIS — M81.0 AGE-RELATED OSTEOPOROSIS WITHOUT CURRENT PATHOLOGICAL FRACTURE: ICD-10-CM

## 2024-11-19 DIAGNOSIS — I65.23 BILATERAL CAROTID ARTERY STENOSIS: ICD-10-CM

## 2024-11-19 DIAGNOSIS — E78.2 MIXED HYPERLIPIDEMIA: ICD-10-CM

## 2024-11-19 PROCEDURE — G0439 PPPS, SUBSEQ VISIT: HCPCS | Mod: ,,, | Performed by: INTERNAL MEDICINE

## 2024-11-22 ENCOUNTER — DOCUMENTATION ONLY (OUTPATIENT)
Dept: PRIMARY CARE CLINIC | Facility: CLINIC | Age: 79
End: 2024-11-22
Payer: MEDICARE

## 2025-07-09 ENCOUNTER — TELEPHONE (OUTPATIENT)
Dept: PRIMARY CARE CLINIC | Facility: CLINIC | Age: 80
End: 2025-07-09
Payer: MEDICARE

## 2025-07-09 NOTE — TELEPHONE ENCOUNTER
The patient called in regards to Dr. Oliveira retiring and she ws wondering if you will be able to refill some of her medications from him?

## 2025-07-24 ENCOUNTER — TELEPHONE (OUTPATIENT)
Dept: PRIMARY CARE CLINIC | Facility: CLINIC | Age: 80
End: 2025-07-24
Payer: MEDICARE

## 2025-07-28 ENCOUNTER — OFFICE VISIT (OUTPATIENT)
Dept: PRIMARY CARE CLINIC | Facility: CLINIC | Age: 80
End: 2025-07-28
Payer: MEDICARE

## 2025-07-28 VITALS
TEMPERATURE: 98 F | BODY MASS INDEX: 30.82 KG/M2 | HEIGHT: 60 IN | SYSTOLIC BLOOD PRESSURE: 130 MMHG | HEART RATE: 77 BPM | RESPIRATION RATE: 16 BRPM | DIASTOLIC BLOOD PRESSURE: 76 MMHG | WEIGHT: 157 LBS | OXYGEN SATURATION: 97 %

## 2025-07-28 DIAGNOSIS — I65.23 BILATERAL CAROTID ARTERY STENOSIS: ICD-10-CM

## 2025-07-28 DIAGNOSIS — I70.0 AORTIC ATHEROSCLEROSIS: ICD-10-CM

## 2025-07-28 DIAGNOSIS — E11.649 TYPE 2 DIABETES MELLITUS WITH HYPOGLYCEMIA WITHOUT COMA, WITHOUT LONG-TERM CURRENT USE OF INSULIN: Primary | ICD-10-CM

## 2025-07-28 DIAGNOSIS — I25.10 ATHEROSCLEROSIS OF NATIVE CORONARY ARTERY OF NATIVE HEART WITHOUT ANGINA PECTORIS: ICD-10-CM

## 2025-07-28 DIAGNOSIS — E11.21 DIABETIC NEPHROPATHY ASSOCIATED WITH TYPE 2 DIABETES MELLITUS: ICD-10-CM

## 2025-07-28 LAB — HBA1C MFR BLD: 8.2 %

## 2025-07-28 PROCEDURE — 99214 OFFICE O/P EST MOD 30 MIN: CPT | Mod: ,,, | Performed by: INTERNAL MEDICINE

## 2025-07-28 PROCEDURE — 83036 HEMOGLOBIN GLYCOSYLATED A1C: CPT | Mod: QW,,, | Performed by: INTERNAL MEDICINE

## 2025-07-28 RX ORDER — GLIMEPIRIDE 4 MG/1
4 TABLET ORAL 2 TIMES DAILY
Qty: 180 TABLET | Refills: 1 | Status: SHIPPED | OUTPATIENT
Start: 2025-07-28

## 2025-07-28 RX ORDER — METFORMIN HYDROCHLORIDE 1000 MG/1
1000 TABLET ORAL 2 TIMES DAILY
Qty: 180 TABLET | Refills: 1 | Status: SHIPPED | OUTPATIENT
Start: 2025-07-28

## 2025-07-28 NOTE — PROGRESS NOTES
Venessa Jiame MD   [address] [place] [address]     Patient ID: [id number]     Chief Complaint: 6 Months follow up for medication management        HPI:     Ade Mendez is a 79 y.o. female here today for a follow up of diabetes with Dr Tee flores. She will not see the new diabetic doctor until December and she will need refills on the metformin and glimepiride. She has appointment with Dr hCeema. She did do her eye visit with Dr Denton. No other complaints today.       Subjective:     Review of Systems   HENT:          Sees Dentist 25th of August   Eyes:         Just did eye   Respiratory: Negative.     Cardiovascular: Negative.  Negative for chest pain, palpitations and leg swelling.        To do carotid on one side again, she is not sure who the surgeon is, Dr Valentino referred her. She worries he'll be next on the [facility] plans.    Musculoskeletal:         Arthritis is doing ok.    Endo/Heme/Allergies:         Sugar was 73 this AM. She has been having a lot of low sugars. MG due in October       Past Medical History:   Diagnosis Date    Alkaline phosphatase elevation     Anxiety     CAD (coronary artery disease)     Cataracts, bilateral     Diabetes mellitus, type 2     Diverticulosis     Hepatitis B     Osteoporosis     Rectal polyp 07/05/2017    Renal disorder         Past Surgical History:   Procedure Laterality Date    APPENDECTOMY      CATARACT EXTRACTION, BILATERAL      COLONOSCOPY W/ BIOPSIES  06/28/2017    CORONARY ARTERY BYPASS GRAFT (CABG)  02/21/2022    quadruple    CORONARY STENT PLACEMENT      KNEE SURGERY      REPAIR OF HOLE IN [place]      SKIN CANCER EXCISION  06/07/2021    head lesion    TOTAL ABDOMINAL HYSTERECTOMY      WRIST SURGERY  10/10/2019       Family History   Problem Relation Name Age of Onset    Diabetes Mother      Liver cancer Brother      Lung cancer Brother      Schizophrenia Brother          Social History[1]    Review of patient's allergies indicates:   Allergen  Reactions    Oxycontin [oxycodone]        Outpatient Medications Marked as Taking for the 7/28/25 encounter (Office Visit) with Venessa Jaime MD   Medication Sig Dispense Refill    aspirin (ECOTRIN) 81 MG EC tablet Take 81 mg by mouth once daily at 6am.      calcium carbonate (CALCIUM 600 ORAL) Take by mouth.      cholecalciferol, vitamin D3, (VITAMIN D3) 50 mcg (2,000 unit) Cap capsule Take 4,000 Units by mouth once daily.      cinnamon bark (CINNAMON ORAL) Take 1,000 mg by mouth.      coenzyme Q10 400 mg Cap Take by mouth.      cranberry conc-ascorbic acid 4,200-20 mg Cap Take by mouth.      empagliflozin (JARDIANCE) 10 mg tablet Take 1 tablet by mouth once daily.      FLAXSEED OIL ORAL Take 1,200 mg by mouth.      metoprolol tartrate (LOPRESSOR) 25 MG tablet Take 1 tablet by mouth 2 (two) times daily.      montelukast (SINGULAIR) 10 mg tablet Take 10 mg by mouth once daily at 6am.      mv-min-folic-vit K-lut-herb293 (ALIVE WOMEN'S 50 PLUS, BLEND,) 240-120-300 mcg Tab Take by mouth.      olopatadine HCl (PATADAY OPHT) Apply to eye.      omega-3 fatty acids/fish oil (FISH OIL-OMEGA-3 FATTY ACIDS) 300-1,000 mg capsule Take by mouth once daily.      polyvinyl alcohol (LIQUID TEARS OPHT) Apply to eye.      risedronate (ACTONEL) 150 MG Tab Take 150 mg by mouth every 30 days.      rosuvastatin (CRESTOR) 10 MG tablet Take 10 mg by mouth once daily at 6am.      spironolactone (ALDACTONE) 25 MG tablet Take 25 mg by mouth once daily.      [DISCONTINUED] glimepiride (AMARYL) 4 MG tablet Take 4 mg by mouth 2 (two) times a day.      [DISCONTINUED] metFORMIN (GLUCOPHAGE) 1000 MG tablet Take 1,000 mg by mouth 2 (two) times a day.         Patient Care Team:  Venessa Jaime MD as PCP - General (Internal Medicine)  Bri Oliveira MD as Consulting Physician (Endocrinology)  Valentino, Vernon A., MD as Consulting Physician (Cardiology)  Hector Lane MD as Consulting Physician (Ophthalmology)  Arya Denton, OD  (Ophthalmology)  Robert Doll MD as Consulting Physician (Obstetrics and Gynecology)  Milena Altman MD (Dermatology)  Star Narayanan MD as Consulting Physician (Cardiothoracic Surgery)  PRAFUL Call MD as Consulting Physician (Gastroenterology)  Ashlyn Ramsey LPN as Licensed Practical Nurse  Raleigh Arboleda MD as Consulting Physician (Orthopedic Surgery)       Objective:     /76   Pulse 77   Temp 97.8 °F (36.6 °C) (Oral)   Resp 16   Ht 5' (1.524 m)   Wt 71.2 kg (157 lb)   SpO2 97%   BMI 30.66 kg/m²     Physical Exam  Vitals reviewed.   Cardiovascular:      Rate and Rhythm: Normal rate and regular rhythm.   Pulmonary:      Effort: Pulmonary effort is normal.      Breath sounds: Normal breath sounds.   Skin:     General: Skin is warm and dry.   Neurological:      Mental Status: She is alert.           Lab Results   Component Value Date    [id number] 8.2 07/28/2025         Assessment/Plan:     1. Type 2 diabetes mellitus with hypoglycemia without coma, without long-term current use of insulin  Comments:  DIscussed on weeks she is having levels in 70-80 range she may do better taking only half a dose of the Amaryl at night.    2. Diabetic nephropathy associated with type 2 diabetes mellitus  Comments:  Did protein but not microalbumin with Lab Olga Lidia in May  Overview:  proteinuria  proteinuria    Orders:  -     Hemoglobin A1C, POCT  -     Microalbumin/Creatinine Ratio, Urine    3. Bilateral carotid artery stenosis  Comments:  Continue Rosuvastatin, ask surgeon to send us a report as well    4. Aortic atherosclerosis  Comments:  Continue Rosuvastatin and ASA    5. Atherosclerosis of native coronary artery of native heart without angina pectoris  Comments:  Continue Metoprolol, Jardiance and Crestor    Other orders  -     metFORMIN (GLUCOPHAGE) 1000 MG tablet; Take 1 tablet (1,000 mg total) by mouth 2 (two) times a day.  Dispense: 180 tablet; Refill: 1  -     glimepiride (AMARYL)  4 MG tablet; Take 1 tablet (4 mg total) by mouth 2 (two) times a day.  Dispense: 180 tablet; Refill: 1       Readings don't match her A1C, she might benefit from Freestyle Sindhu or Dexcom, she doesn't like the idea of them at this time, will defer to Dr Cheema      Follow up in about 4 months (around 11/25/2025) for Wellness. In addition to their scheduled follow up, the patient has also been instructed to follow up on as needed basis.     Signature:  Venessa Jaime MD  [facility]  [address] [address]         [1]   Social History  Socioeconomic History    Marital status: Single   Tobacco Use    Smoking status: Never    Smokeless tobacco: Never     Social Drivers of Health     Financial Resource Strain: Low Risk  (7/28/2025)    Overall Financial Resource Strain (CARDIA)     Difficulty of Paying Living Expenses: Not hard at all   Food Insecurity: No Food Insecurity (7/28/2025)    Hunger Vital Sign     Worried About Running Out of Food in the Last Year: Never true     Ran Out of Food in the Last Year: Never true   Transportation Needs: No Transportation Needs (7/28/2025)    PRAPARE - Transportation     Lack of Transportation (Medical): No     Lack of Transportation (Non-Medical): No   Physical Activity: Inactive (7/28/2025)    Exercise Vital Sign     Days of Exercise per Week: 0 days     Minutes of Exercise per Session: 0 min   Stress: No Stress Concern Present (7/28/2025)    [employer] [place] of [employer] - Occupational Stress Questionnaire     Feeling of Stress : Not at all   Housing Stability: Low Risk  (7/28/2025)    Housing Stability Vital Sign     Unable to Pay for Housing in the Last Year: No     Number of Times Moved in the Last Year: 0     Homeless in the Last Year: No

## 2025-07-29 ENCOUNTER — TELEPHONE (OUTPATIENT)
Dept: PRIMARY CARE CLINIC | Facility: CLINIC | Age: 80
End: 2025-07-29
Payer: MEDICARE

## 2025-07-29 LAB
ALBUMIN/CREAT UR: NORMAL MG/G CREAT (ref 0–29)
CREAT UR-MCNC: 32.7 MG/DL
MICROALBUMIN UR-MCNC: <12 UG/ML

## 2025-07-29 NOTE — TELEPHONE ENCOUNTER
----- Message from Venessa Jaime MD sent at 7/29/2025  3:07 PM CDT -----  Urine test is good, no sign of damage from diabetes.   ----- Message -----  From: Elisabeth Dutton LPN  Sent: 7/28/2025   9:10 AM CDT  To: Venessa Jaime MD

## 2025-08-18 ENCOUNTER — TELEPHONE (OUTPATIENT)
Dept: PRIMARY CARE CLINIC | Facility: CLINIC | Age: 80
End: 2025-08-18
Payer: MEDICARE